# Patient Record
Sex: FEMALE | Race: WHITE | NOT HISPANIC OR LATINO | Employment: PART TIME | URBAN - NONMETROPOLITAN AREA
[De-identification: names, ages, dates, MRNs, and addresses within clinical notes are randomized per-mention and may not be internally consistent; named-entity substitution may affect disease eponyms.]

---

## 2017-10-04 ENCOUNTER — OFFICE VISIT (OUTPATIENT)
Dept: MEDICAL GROUP | Facility: CLINIC | Age: 22
End: 2017-10-04
Payer: COMMERCIAL

## 2017-10-04 VITALS
WEIGHT: 127.5 LBS | TEMPERATURE: 99.3 F | DIASTOLIC BLOOD PRESSURE: 64 MMHG | SYSTOLIC BLOOD PRESSURE: 116 MMHG | OXYGEN SATURATION: 97 % | HEIGHT: 66 IN | BODY MASS INDEX: 20.49 KG/M2 | RESPIRATION RATE: 14 BRPM | HEART RATE: 88 BPM

## 2017-10-04 DIAGNOSIS — R00.2 HEART PALPITATIONS: ICD-10-CM

## 2017-10-04 DIAGNOSIS — Z76.89 ENCOUNTER TO ESTABLISH CARE: ICD-10-CM

## 2017-10-04 PROCEDURE — 99202 OFFICE O/P NEW SF 15 MIN: CPT | Performed by: PHYSICIAN ASSISTANT

## 2017-10-04 RX ORDER — NORGESTIMATE AND ETHINYL ESTRADIOL 7DAYSX3 LO
KIT ORAL
COMMUNITY

## 2017-10-04 ASSESSMENT — PATIENT HEALTH QUESTIONNAIRE - PHQ9: CLINICAL INTERPRETATION OF PHQ2 SCORE: 0

## 2017-10-04 NOTE — ASSESSMENT & PLAN NOTE
Patient has noticed that she gets palpitations occasionally for no apparent reason. These have occurred since she was very young. She has an apple watch which has been recording her heart rate and she states that when she feels her palpitations, her heart rate monitor shows her rate increasing as high as 180bmp. When she gets palpitations she has a pressure and burning sensation over her chest that is alleviated immediately once her rate returns to normal. She states that sometimes she feels like her heart will stop beating and she feels a pinching sensation over her chest which goes away as soon as her rate returns to normal. During these occasions, her heart rate will sometimes be in the 50's bmp according to her apple watch.

## 2017-10-04 NOTE — PROGRESS NOTES
Chief Complaint   Patient presents with   • Establish Care     palpitations       HISTORY OF THE PRESENT ILLNESS: This is a 22 y.o. female new patient to our practice who presents today for evaluation and management of:    Heart palpitations  Patient has noticed that she gets palpitations occasionally for no apparent reason. These have occurred since she was very young. She has an apple watch which has been recording her heart rate and she states that when she feels her palpitations, her heart rate monitor shows her rate increasing as high as 180bmp. When she gets palpitations she has a pressure and burning sensation over her chest that is alleviated immediately once her rate returns to normal. She states that sometimes she feels like her heart will stop beating and she feels a pinching sensation over her chest which goes away as soon as her rate returns to normal. During these occasions, her heart rate will sometimes be in the 50's bmp according to her apple watch.     Encounter to establish care  Patient has a very simple PMH and feels generally healthy except her heart palpations.       No past medical history on file.    Past Surgical History:   Procedure Laterality Date   • OTHER SURGICAL PROCEDURE      cleft palate   • PRIMARY C SECTION         Family Status   Relation Status   • Mother Alive   • Father Alive   • Brother Alive   • Child Alive     Family History   Problem Relation Age of Onset   • Asthma Mother    • Diabetes Father      II   • Asthma Brother    • Respiratory Disease Child        Social History   Substance Use Topics   • Smoking status: Never Smoker   • Smokeless tobacco: Never Used   • Alcohol use 0.6 oz/week     1 Glasses of wine per week       Allergies: Cillins [penicillins]    Current Outpatient Prescriptions Ordered in [facility]   Medication Sig Dispense Refill   • Norgestim-Eth Estrad Triphasic (TRI-LO-OLIVE) 0.18/0.215/0.25 MG-25 MCG Tab Take  by mouth.       No current Epic-ordered  "facility-administered medications on file.      Review of Systems:   Constitutional: Negative for fever, chills, unplanned weight change and malaise/fatigue.   HENT: Negative for ear pain or tinnitus, nosebleeds, congestion, sore throat and neck pain.    Eyes: Negative for blurred or decreased vision.   Respiratory: Negative for cough, sputum production, shortness of breath and wheezing.    Cardiovascular: See HPI above. Positive for near-syncope during cardiac events.  Negative for, orthopnea, syncope and leg swelling.   Gastrointestinal: Negative for heartburn, nausea, vomiting and abdominal pain.   Genitourinary: Negative for dysuria, urgency and frequency.   Musculoskeletal: Negative for myalgias, back pain and joint pain.   Skin: Negative for rash, itching, nail changes or skin changes.   Neurological: Negative for tremors, sensory change, focal weakness, tingling and headaches.   Endo/Heme/Allergies: Does not bruise/bleed easily.    Psychiatric/Behavioral: Negative for depression, suicidal ideas and memory loss. The patient is not nervous/anxious and does not have insomnia.  Pt does not use recreational drugs or excessive alcohol.       Exam:  Blood pressure 116/64, pulse 88, temperature 37.4 °C (99.3 °F), resp. rate 14, height 1.676 m (5' 6\"), weight 57.8 kg (127 lb 8 oz), SpO2 97 %.  General:  Healthy-Appearing female in NAD  Eyes: Conjunctiva clear, lids without ptosis  ENMT: Nose and lips without deformity. Nasal mucosa and septum pink without evidence of purulent drainage.  Neck: Supple without masses upon palpation. Thyroid is not visibly enlarged.  Pulmonary: Normal effort. No rales, ronchi, or wheezing upon auscultation.   Cardiovascular: Regular rate and rhythm without murmur. Carotid and radial pulses are intact and equal bilaterally.   Extremities: No clubbing or cyanosis. Bilateral upper and lower extremities without edema.   Skin: Warm and dry.  No obvious lesions.  Musculoskeletal: Normal gait. "   Psych: Normal mood and affect. Alert and oriented x3. Judgment and insight is normal.      Medical Decision Making & Discussion:   1. Heart palpitations  Patient advised regarding how to perform valsalva exercise and carotid massage to try and fix her heart rhythm.   - EKG - Clinic performed  - CMP (12)  - HOLTER MONITOR / EVENT RECORDER; Future        Please note that this dictation was created using voice recognition software. I have made every reasonable attempt to correct obvious errors, but I expect that there are errors of grammar and possibly content that I did not discover before finalizing the note.    Return if symptoms worsen or fail to improve.

## 2018-08-02 NOTE — ASSESSMENT & PLAN NOTE
Patient has a very simple PMH and feels generally healthy except her heart palpations.    Kendell Washington